# Patient Record
Sex: FEMALE | Race: BLACK OR AFRICAN AMERICAN | Employment: OTHER | URBAN - METROPOLITAN AREA
[De-identification: names, ages, dates, MRNs, and addresses within clinical notes are randomized per-mention and may not be internally consistent; named-entity substitution may affect disease eponyms.]

---

## 2017-07-07 ENCOUNTER — OFFICE VISIT (OUTPATIENT)
Dept: CARDIOLOGY CLINIC | Age: 77
End: 2017-07-07

## 2017-07-07 VITALS
HEIGHT: 64 IN | SYSTOLIC BLOOD PRESSURE: 110 MMHG | DIASTOLIC BLOOD PRESSURE: 60 MMHG | BODY MASS INDEX: 31.48 KG/M2 | HEART RATE: 60 BPM | WEIGHT: 184.4 LBS

## 2017-07-07 DIAGNOSIS — R00.2 PALPITATIONS: ICD-10-CM

## 2017-07-07 DIAGNOSIS — R94.31 ABNORMAL EKG: ICD-10-CM

## 2017-07-07 DIAGNOSIS — E78.00 HYPERCHOLESTEREMIA: ICD-10-CM

## 2017-07-07 DIAGNOSIS — I10 ESSENTIAL HYPERTENSION, BENIGN: Primary | ICD-10-CM

## 2017-07-07 NOTE — MR AVS SNAPSHOT
Visit Information Date & Time Provider Department Dept. Phone Encounter #  
 7/7/2017  3:00 PM Nick Barker MD [facility] [phone] [id number] Follow-up Instructions Return in about 1 year (around 7/7/2018). Upcoming Health Maintenance Date Due DTaP/Tdap/Td series (1 - Tdap) 5/6/1960 ZOSTER VACCINE AGE 60> 5/6/1999 GLAUCOMA SCREENING Q2Y 5/6/2004 OSTEOPOROSIS SCREENING (DEXA) 5/6/2004 Pneumococcal 65+ Low/Medium Risk (1 of 2 - PCV13) 5/6/2004 MEDICARE YEARLY EXAM 5/6/2004 INFLUENZA AGE 9 TO ADULT 8/1/2017 Allergies as of 7/7/2017  Review Complete On: 7/7/2017 By: Rebeka Friend LPN Severity Noted Reaction Type Reactions Aspirin  10/11/2010   Intolerance Nausea Only Codeine  10/11/2010   Intolerance Nausea Only Lovastatin  11/04/2016    Myalgia Current Immunizations  Never Reviewed No immunizations on file. Not reviewed this visit You Were Diagnosed With   
  
 Codes Comments Essential hypertension, benign    -  Primary ICD-10-CM: I10 
ICD-9-CM: 402. 1 Abnormal EKG     ICD-10-CM: R94.31 
ICD-9-CM: 794.31 Hypercholesteremia     ICD-10-CM: E78.00 ICD-9-CM: 272.0 Palpitations     ICD-10-CM: R00.2 ICD-9-CM: 785.1 Vitals BP Pulse Height(growth percentile) Weight(growth percentile) BMI OB Status 110/60 60 5' 4\" (1.626 m) 184 lb 6.4 oz (83.6 kg) 31.65 kg/m2 Postmenopausal  
 Smoking Status Never Smoker Vitals History BMI and BSA Data Body Mass Index Body Surface Area  
 31.65 kg/m 2 1.94 m 2 Preferred Pharmacy Pharmacy Name Phone [facility] [address]  [facility] [phone] Your Updated Medication List  
  
   
This list is accurate as of: 7/7/17  3:08 PM.  Always use your most recent med list. amLODIPine 10 mg tablet Commonly known as:  Nyla Main Take  by mouth daily. bisoprolol-hydroCHLOROthiazide 2.5-6.25 mg per tablet Commonly known as:  [facility] Take 1 Tab by mouth daily. Pt advised not to take this medication the day of surgery per anesthesia protocol. COUMADIN 2 mg tablet Generic drug:  warfarin  
5 mg daily. Pt states she takes 5 mg daily. Next check due 12/16  
  
 doxazosin 1 mg tablet Commonly known as:  CARDURA Take 2 mg by mouth daily. ergocalciferol 50,000 unit capsule Commonly known as:  ERGOCALCIFEROL Take 50,000 Units by mouth every seven (7) days. hydroCHLOROthiazide 12.5 mg capsule Commonly known as:  Christia Members Take 12.5 mg by mouth daily. Pt instructed to omit this medication the morning of surgery per anesthesia protocol. K-DUR 10 mEq tablet Generic drug:  potassium chloride Take 10 mEq by mouth two (2) times a day. Pt advised not to take the day of surgery, per anesthesia guidelines. LORazepam 1 mg tablet Commonly known as:  ATIVAN Take 0.5 mg by mouth. Takes one half to one tablet daily  
  
 losartan 100 mg tablet Commonly known as:  COZAAR Take 100 mg by mouth daily. PREVACID 30 mg capsule Generic drug:  lansoprazole Take  by mouth Daily (before breakfast). Pt instructed to take this medication the morning of surgery, per anesthesia protocol. Follow-up Instructions Return in about 1 year (around 7/7/2018). Introducing [facility]! Barbara Chambers introduces [employer] patient portal. Now you can access parts of your medical record, email your doctor's office, and request medication refills online. 1. In your internet browser, go to [email or web address] 2. Click on the First Time User? Click Here link in the Sign In box. You will see the New Member Sign Up page. 3. Enter your [employer] Access Code exactly as it appears below.  You will not need to use this code after youve completed the sign-up process. If you do not sign up before the expiration date, you must request a new code. · [employer] Access Code: [email or web address] Expires: 10/5/2017  3:08 PM 
 
4. Enter the last four digits of your Social Security Number (xxxx) and Date of Birth (mm/dd/yyyy) as indicated and click Submit. You will be taken to the next sign-up page. 5. Create a [employer] ID. This will be your [employer] login ID and cannot be changed, so think of one that is secure and easy to remember. 6. Create a [employer] password. You can change your password at any time. 7. Enter your Password Reset Question and Answer. This can be used at a later time if you forget your password. 8. Enter your e-mail address. You will receive e-mail notification when new information is available in [address] 9. Click Sign Up. You can now view and download portions of your medical record. 10. Click the Download Summary menu link to download a portable copy of your medical information. If you have questions, please visit the Frequently Asked Questions section of the [employer] website. Remember, [employer] is NOT to be used for urgent needs. For medical emergencies, dial 911. Now available from your iPhone and Android! Please provide this summary of care documentation to your next provider. Your primary care clinician is listed as Maria Isabel Bautista. If you have any questions after today's visit, please call [phone].

## 2017-07-07 NOTE — PROGRESS NOTES
HISTORY OF PRESENT ILLNESS  Kamille Botello is a 65 y.o. female     SUMMARY:   Problem List  Date Reviewed: 7/7/2017          Codes Class Noted    Palpitations ICD-10-CM: R00.2  ICD-9-CM: 785.1  8/17/2016    Overview Signed 8/17/2016  5:18 PM by Sophia Pena MD     5/16 negative event monitor             Chest pain ICD-10-CM: R07.9  ICD-9-CM: 786.50  10/10/2014    Overview Addendum 10/10/2014  7:09 AM by Sophia Pena MD     4/06 echo, normal left ventricular size and function with mild mitral and tricuspid regurgitation and trace aortic insufficiency  10/10 negative lexiscan cardiolyte, lvef 68%             Abnormal EKG ICD-10-CM: R94.31  ICD-9-CM: 794.31  10/10/2014        Parastomal hernia of ileal conduit ([facility] [address] ICD-10-CM: K94.19  ICD-9-CM: 569.69  10/3/2011        Essential hypertension, benign ICD-10-CM: I10  ICD-9-CM: 401.1  10/6/2010        Hypercholesteremia ICD-10-CM: E78.00  ICD-9-CM: 272.0  10/6/2010              Current Outpatient Prescriptions on File Prior to Visit   Medication Sig    ergocalciferol (ERGOCALCIFEROL) 50,000 unit capsule Take 50,000 Units by mouth every seven (7) days.  doxazosin (CARDURA) 1 mg tablet Take 2 mg by mouth daily.  losartan (COZAAR) 100 mg tablet Take 100 mg by mouth daily.  amlodipine (NORVASC) 10 mg tablet Take  by mouth daily.  bisoprolol-hydrochlorothiazide (ZIAC) 2.5-6.25 mg per tablet Take 1 Tab by mouth daily. Pt advised not to take this medication the day of surgery per anesthesia protocol.  warfarin (COUMADIN) 2 mg tablet 5 mg daily. Pt states she takes 5 mg daily. Next check due 12/16    hydrochlorothiazide (MICROZIDE) 12.5 mg capsule Take 12.5 mg by mouth daily. Pt instructed to omit this medication the morning of surgery per anesthesia protocol.  lansoprazole (PREVACID) 30 mg capsule Take  by mouth Daily (before breakfast). Pt instructed to take this medication the morning of surgery, per anesthesia protocol.     potassium chloride (K-DUR) 10 mEq tablet Take 10 mEq by mouth two (2) times a day. Pt advised not to take the day of surgery, per anesthesia guidelines.  lorazepam (ATIVAN) 1 mg tablet Take 0.5 mg by mouth. Takes one half to one tablet daily     No current facility-administered medications on file prior to visit. CARDIOLOGY STUDIES TO DATE:  4/06 echo, normal left ventricular size and function with mild mitral and tricuspid regurgitation and trace aortic insufficiency  10/10 negative lexiscan cardiolyte, lvef 68%  5/15 negative event monitor  6/16 negative event monitor      Chief Complaint   Patient presents with    Hypertension     HPI :  Ms. Dion Salazar recently saw Dr. Stefani Swain and was complaining of some shortness of breath with activity. This is an old complaint she has had with me for years, and all of her testing has never revealed any significant cardiac abnormalities. She is very inactive because of knee and hip pain and gets no regular exercise. Dr. Stefani Swain did an x-ray and an EKG and told Ms. Contreras that everything looked fine.      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 Visit Vitals    /60    Pulse 60    Ht 5' 4\" (1.626 m)    Wt 184 lb 6.4 oz (83.6 kg)    BMI 31.65 kg/m2       Wt Readings from Last 3 Encounters:   07/07/17 184 lb 6.4 oz (83.6 kg)   11/29/16 184 lb (83.5 kg)   11/04/16 187 lb (84.8 kg)            BP Readings from Last 3 Encounters:   07/07/17 110/60   11/29/16 155/71   11/04/16 130/66       PHYSICAL EXAM  General appearance: alert, cooperative, no distress, appears stated age  Neck: supple, symmetrical, trachea midline, no adenopathy, no carotid bruit and no JVD  Lungs: clear to auscultation bilaterally  Heart: regular rate and rhythm, S1, S2 normal, no murmur, click, rub or gallop  Extremities: extremities normal, atraumatic, no cyanosis or edema      ASSESSMENT  As I mentioned this is an old complaint to me from Ms. Contreras and given her recent x-ray and EKG findings I have no reason to suspect any bad cardiac issues at this time. I suggested she consider getting a stationary bike and try some light exercise on that she should be able to do in spite of her orthopedic issues. She needs no specific cardiac testing at this time. current treatment plan is effective, no change in therapy  lab results and schedule of future lab studies reviewed with patient  reviewed diet, exercise and weight control    Encounter Diagnoses   Name Primary?  Essential hypertension, benign Yes    Abnormal EKG     Hypercholesteremia     Palpitations      No orders of the defined types were placed in this encounter. Follow-up Disposition:  Return in about 1 year (around 7/7/2018).     Trevon Green MD  7/7/2017

## 2018-06-07 ENCOUNTER — OFFICE VISIT (OUTPATIENT)
Dept: CARDIOLOGY CLINIC | Age: 78
End: 2018-06-07

## 2018-06-07 VITALS
DIASTOLIC BLOOD PRESSURE: 80 MMHG | WEIGHT: 181 LBS | HEIGHT: 64 IN | BODY MASS INDEX: 30.9 KG/M2 | OXYGEN SATURATION: 99 % | HEART RATE: 70 BPM | RESPIRATION RATE: 20 BRPM | SYSTOLIC BLOOD PRESSURE: 160 MMHG

## 2018-06-07 DIAGNOSIS — I10 ESSENTIAL HYPERTENSION, BENIGN: Primary | ICD-10-CM

## 2018-06-07 DIAGNOSIS — R94.31 ABNORMAL EKG: ICD-10-CM

## 2018-06-07 DIAGNOSIS — R00.2 PALPITATIONS: ICD-10-CM

## 2018-06-07 DIAGNOSIS — E78.00 HYPERCHOLESTEREMIA: ICD-10-CM

## 2018-06-07 RX ORDER — SPIRONOLACTONE 25 MG/1
12.5 TABLET ORAL DAILY
COMMUNITY

## 2018-06-07 RX ORDER — PHENOL/SODIUM PHENOLATE
20 AEROSOL, SPRAY (ML) MUCOUS MEMBRANE DAILY
COMMUNITY

## 2018-06-07 NOTE — PROGRESS NOTES
HISTORY OF PRESENT ILLNESS  Terra Sam is a 77 y.o. female     SUMMARY:   Problem List  Date Reviewed: 6/7/2018          Codes Class Noted    Palpitations ICD-10-CM: R00.2  ICD-9-CM: 785.1  8/17/2016    Overview Signed 8/17/2016  5:18 PM by Jesse Rosales MD     5/16 negative event monitor             Chest pain ICD-10-CM: R07.9  ICD-9-CM: 786.50  10/10/2014    Overview Addendum 10/10/2014  7:09 AM by Jesse Rosales MD     4/06 echo, normal left ventricular size and function with mild mitral and tricuspid regurgitation and trace aortic insufficiency  10/10 negative lexiscan cardiolyte, lvef 68%             Abnormal EKG ICD-10-CM: R94.31  ICD-9-CM: 794.31  10/10/2014        Parastomal hernia of ileal conduit ([facility] [address] ICD-10-CM: K94.19  ICD-9-CM: 569.69  10/3/2011        Essential hypertension, benign ICD-10-CM: I10  ICD-9-CM: 401.1  10/6/2010        Hypercholesteremia ICD-10-CM: E78.00  ICD-9-CM: 272.0  10/6/2010              Current Outpatient Prescriptions on File Prior to Visit   Medication Sig    doxazosin (CARDURA) 1 mg tablet Take 2 mg by mouth daily.  losartan (COZAAR) 100 mg tablet Take 100 mg by mouth daily.  amlodipine (NORVASC) 10 mg tablet Take  by mouth daily.  bisoprolol-hydrochlorothiazide (ZIAC) 2.5-6.25 mg per tablet Take 1 Tab by mouth daily. Pt advised not to take this medication the day of surgery per anesthesia protocol.  warfarin (COUMADIN) 2 mg tablet 5 mg daily. Pt states she takes 5 mg daily. Next check due 12/16    hydrochlorothiazide (MICROZIDE) 12.5 mg capsule Take 12.5 mg by mouth daily. Pt instructed to omit this medication the morning of surgery per anesthesia protocol.  potassium chloride (K-DUR) 10 mEq tablet Take 10 mEq by mouth two (2) times a day. Pt advised not to take the day of surgery, per anesthesia guidelines.  lorazepam (ATIVAN) 1 mg tablet Take 0.5 mg by mouth.  Takes one half to one tablet daily    ergocalciferol (ERGOCALCIFEROL) 50,000 unit capsule Take 50,000 Units by mouth every seven (7) days.  lansoprazole (PREVACID) 30 mg capsule Take  by mouth Daily (before breakfast). Pt instructed to take this medication the morning of surgery, per anesthesia protocol. No current facility-administered medications on file prior to visit. CARDIOLOGY STUDIES TO DATE:  4/06 echo, normal left ventricular size and function with mild mitral and tricuspid regurgitation and trace aortic insufficiency  10/10 negative lexiscan cardiolyte, lvef 68%  5/15 negative event monitor  6/16 negative event monitor        Chief Complaint   Patient presents with    Hypertension     HPI :  Ms. Kelly Badillo is doing well. Blood pressure is a little elevated today, but she just took her morning medications and she gets her Coumadin checked once a month with Dr. Thomas Chang and they check her blood pressure there and it has been just fine. She is active, but not exercising or walking like she should. CARDIAC ROS:   negative for chest pain, dyspnea, palpitations, syncope, orthopnea, paroxysmal nocturnal dyspnea, exertional chest pressure/discomfort, claudication, lower extremity edema    Family History   Problem Relation Age of Onset    Heart Disease Mother     Heart Disease Father        Past Medical History:   Diagnosis Date    Arthritis     BOTH KNEES AND BOTH HANDS RIGHT HIP    Cancer [facility]) 2006    BLADDER    Cancer ([facility] [address] 1982    BREAST RIGHT CA    Chronic pain     ARTHRITIS    Essential hypertension     GERD (gastroesophageal reflux disease)     [place] WITH MEDICATION    Hypercholesteremia 10/6/2010    Hypertension     TREATED WITH MEDICATION    Other ill-defined conditions(799.89)     IVCD Filter    Psychiatric disorder     ANXIETY    Thromboembolus ([address]     BLOOD CLOTS IN BOTH LEGS FOLLOWING BLADDER Merle Specking TAKES COUMADIN       GENERAL ROS:  A comprehensive review of systems was negative except for that written in the HPI.     Visit Vitals    BP 160/80 (BP 1 Location: Left arm, BP Patient Position: Sitting)    Pulse 70    Resp 20    Ht 5' 4\" (1.626 m)    Wt 181 lb (82.1 kg)    SpO2 99%    BMI 31.07 kg/m2       Wt Readings from Last 3 Encounters:   06/07/18 181 lb (82.1 kg)   07/07/17 184 lb 6.4 oz (83.6 kg)   11/29/16 184 lb (83.5 kg)            BP Readings from Last 3 Encounters:   06/07/18 160/80   07/07/17 110/60   11/29/16 155/71       PHYSICAL EXAM  General appearance: alert, cooperative, no distress, appears stated age  Neck: supple, symmetrical, trachea midline, no adenopathy, no carotid bruit and no JVD  Lungs: clear to auscultation bilaterally  Heart: regular rate and rhythm, S1, S2 normal, no murmur, click, rub or gallop  Extremities: extremities normal, atraumatic, no cyanosis or edema      ASSESSMENT  Ms. Shey Alfaro is stable, asymptomatic and well compensated on a good medical regimen and needs no cardiac testing at this time. current treatment plan is effective, no change in therapy  lab results and schedule of future lab studies reviewed with patient  reviewed diet, exercise and weight control    Encounter Diagnoses   Name Primary?  Essential hypertension, benign Yes    Abnormal EKG     Hypercholesteremia     Palpitations      Orders Placed This Encounter    spironolactone (ALDACTONE) 25 mg tablet    Omeprazole delayed release (PRILOSEC D/R) 20 mg tablet       Follow-up Disposition:  Return in about 1 year (around 6/7/2019).     Pa Sauceda MD  6/7/2018

## 2018-06-07 NOTE — MR AVS SNAPSHOT
[address] [facility] [address] [place] 
[phone] Patient: Sharla Colin MRN: [id number] KHV:9/1/2038 Visit Information Date & Time Provider Department Dept. Phone Encounter #  
 6/7/2018  9:20 AM Nick Barker MD [facility] [phone] [id number] Follow-up Instructions Return in about 1 year (around 6/7/2019). Your Appointments 6/7/2018  9:20 AM  
ESTABLISHED PATIENT with Nick Barker MD  
[facility] ([facility]) Appt Note: 1 yr f/u; patient r/s to earlier date, pt denies sxs; pt r/s 5-31-18 appt. ..atb  
 [address] [facility] [address] [place]  
[address] [facility] [phone] Upcoming Health Maintenance Date Due DTaP/Tdap/Td series (1 - Tdap) 5/6/1960 ZOSTER VACCINE AGE 60> 3/6/1999 GLAUCOMA SCREENING Q2Y 5/6/2004 Bone Densitometry (Dexa) Screening 5/6/2004 Pneumococcal 65+ Low/Medium Risk (1 of 2 - PCV13) 5/6/2004 MEDICARE YEARLY EXAM 3/14/2018 Influenza Age 5 to Adult 8/1/2018 Allergies as of 6/7/2018  Review Complete On: 6/7/2018 By: Nick Barker MD  
  
 Severity Noted Reaction Type Reactions Aspirin  10/11/2010   Intolerance Nausea Only Codeine  10/11/2010   Intolerance Nausea Only Lovastatin  11/04/2016    Myalgia Current Immunizations  Never Reviewed No immunizations on file. Not reviewed this visit Vitals BP Pulse Resp Height(growth percentile) Weight(growth percentile) SpO2  
 160/80 (BP 1 Location: Left arm, BP Patient Position: Sitting) 70 20 5' 4\" (1.626 m) 181 lb (82.1 kg) 99% BMI OB Status Smoking Status 31.07 kg/m2 Postmenopausal Never Smoker Vitals History BMI and BSA Data Body Mass Index Body Surface Area 31.07 kg/m 2 1.93 m 2 Preferred Pharmacy Pharmacy Name Phone [address] [place] Your Updated Medication List  
  
   
This list is accurate as of 6/7/18  9:02 AM.  Always use your most recent med list. ALDACTONE 25 mg tablet Generic drug:  spironolactone Take 12.5 mg by mouth daily. amLODIPine 10 mg tablet Commonly known as:  Judah Fabian Take  by mouth daily. bisoprolol-hydroCHLOROthiazide 2.5-6.25 mg per tablet Commonly known as:  [facility] Take 1 Tab by mouth daily. Pt advised not to take this medication the day of surgery per anesthesia protocol. COUMADIN 2 mg tablet Generic drug:  warfarin  
5 mg daily. Pt states she takes 5 mg daily. Next check due 12/16  
  
 doxazosin 1 mg tablet Commonly known as:  CARDURA Take 2 mg by mouth daily. ergocalciferol 50,000 unit capsule Commonly known as:  ERGOCALCIFEROL Take 50,000 Units by mouth every seven (7) days. hydroCHLOROthiazide 12.5 mg capsule Commonly known as:  Sharad Dose Take 12.5 mg by mouth daily. Pt instructed to omit this medication the morning of surgery per anesthesia protocol. K-DUR 10 mEq tablet Generic drug:  potassium chloride Take 10 mEq by mouth two (2) times a day. Pt advised not to take the day of surgery, per anesthesia guidelines. LORazepam 1 mg tablet Commonly known as:  ATIVAN Take 0.5 mg by mouth. Takes one half to one tablet daily  
  
 losartan 100 mg tablet Commonly known as:  COZAAR Take 100 mg by mouth daily. Omeprazole delayed release 20 mg tablet Commonly known as:  PRILOSEC D/R Take 20 mg by mouth daily. PREVACID 30 mg capsule Generic drug:  lansoprazole Take  by mouth Daily (before breakfast). Pt instructed to take this medication the morning of surgery, per anesthesia protocol. Follow-up Instructions Return in about 1 year (around 6/7/2019). \[facility] [place] [facility]\"" & [facility]! Gail Thurman introduces [employer] patient portal. Now you can access parts of your medical record, email your doctor's office, and request medication refills online. 1. In your internet browser, go to [email or web address] 2. Click on the First Time User? Click Here link in the Sign In box. You will see the New Member Sign Up page. 3. Enter your [employer] Access Code exactly as it appears below. You will not need to use this code after youve completed the sign-up process. If you do not sign up before the expiration date, you must request a new code. · [employer] Access Code: --[email or web address] Expires: 9/5/2018  9:02 AM 
 
4. Enter the last four digits of your Social Security Number (xxxx) and Date of Birth (mm/dd/yyyy) as indicated and click Submit. You will be taken to the next sign-up page. 5. Create a [employer] ID. This will be your [employer] login ID and cannot be changed, so think of one that is secure and easy to remember. 6. Create a [employer] password. You can change your password at any time. 7. Enter your Password Reset Question and Answer. This can be used at a later time if you forget your password. 8. Enter your e-mail address. You will receive e-mail notification when new information is available in [address] 9. Click Sign Up. You can now view and download portions of your medical record. 10. Click the Download Summary menu link to download a portable copy of your medical information. If you have questions, please visit the Frequently Asked Questions section of the [employer] website. Remember, [employer] is NOT to be used for urgent needs. For medical emergencies, dial 911. Now available from your iPhone and Android! Please provide this summary of care documentation to your next provider. Your primary care clinician is listed as Justine Amaro. If you have any questions after today's visit, please call [phone].

## 2019-06-13 ENCOUNTER — OFFICE VISIT (OUTPATIENT)
Dept: CARDIOLOGY CLINIC | Age: 79
End: 2019-06-13

## 2019-06-13 VITALS
BODY MASS INDEX: 29.6 KG/M2 | OXYGEN SATURATION: 98 % | SYSTOLIC BLOOD PRESSURE: 126 MMHG | HEIGHT: 64 IN | DIASTOLIC BLOOD PRESSURE: 80 MMHG | HEART RATE: 61 BPM | WEIGHT: 173.4 LBS | RESPIRATION RATE: 16 BRPM

## 2019-06-13 DIAGNOSIS — E78.00 HYPERCHOLESTEREMIA: ICD-10-CM

## 2019-06-13 DIAGNOSIS — Z78.9 STATIN INTOLERANCE: ICD-10-CM

## 2019-06-13 DIAGNOSIS — R00.2 PALPITATIONS: Primary | ICD-10-CM

## 2019-06-13 DIAGNOSIS — R94.31 ABNORMAL EKG: ICD-10-CM

## 2019-06-13 DIAGNOSIS — I10 ESSENTIAL HYPERTENSION, BENIGN: ICD-10-CM

## 2019-06-13 NOTE — PROGRESS NOTES
HISTORY OF PRESENT ILLNESS  Juna Barillas is a [de-identified] y.o. female     SUMMARY:   Problem List  Date Reviewed: 6/12/2019          Codes Class Noted    Palpitations ICD-10-CM: R00.2  ICD-9-CM: 785.1  8/17/2016    Overview Signed 8/17/2016  5:18 PM by Palmira Camejo MD     5/16 negative event monitor             Chest pain ICD-10-CM: R07.9  ICD-9-CM: 786.50  10/10/2014    Overview Addendum 10/10/2014  7:09 AM by Palmira Camejo MD     4/06 echo, normal left ventricular size and function with mild mitral and tricuspid regurgitation and trace aortic insufficiency  10/10 negative lexiscan cardiolyte, lvef 68%             Abnormal EKG ICD-10-CM: R94.31  ICD-9-CM: 794.31  10/10/2014        Parastomal hernia of ileal conduit ([facility] [address] ICD-10-CM: K94.19  ICD-9-CM: 569.69  10/3/2011        Essential hypertension, benign ICD-10-CM: I10  ICD-9-CM: 401.1  10/6/2010        Hypercholesteremia ICD-10-CM: E78.00  ICD-9-CM: 272.0  10/6/2010              Current Outpatient Medications on File Prior to Visit   Medication Sig    spironolactone (ALDACTONE) 25 mg tablet Take 12.5 mg by mouth daily.  Omeprazole delayed release (PRILOSEC D/R) 20 mg tablet Take 20 mg by mouth daily.  doxazosin (CARDURA) 1 mg tablet Take 2 mg by mouth daily.  losartan (COZAAR) 100 mg tablet Take 100 mg by mouth daily.  amlodipine (NORVASC) 10 mg tablet Take  by mouth daily.  bisoprolol-hydrochlorothiazide (ZIAC) 2.5-6.25 mg per tablet Take 1 Tab by mouth daily. Pt advised not to take this medication the day of surgery per anesthesia protocol.  warfarin (COUMADIN) 2 mg tablet 5 mg daily. Pt states she takes 5 mg daily. Next check due 12/16    hydrochlorothiazide (MICROZIDE) 12.5 mg capsule Take 12.5 mg by mouth daily. Pt instructed to omit this medication the morning of surgery per anesthesia protocol.  lansoprazole (PREVACID) 30 mg capsule Take  by mouth Daily (before breakfast).  Pt instructed to take this medication the morning of surgery, per anesthesia protocol.  potassium chloride (K-DUR) 10 mEq tablet Take 10 mEq by mouth two (2) times a day. Pt advised not to take the day of surgery, per anesthesia guidelines.  lorazepam (ATIVAN) 1 mg tablet Take 0.5 mg by mouth. Takes one half to one tablet daily     No current facility-administered medications on file prior to visit. CARDIOLOGY STUDIES TO DATE:  4/06 echo, normal left ventricular size and function with mild mitral and tricuspid regurgitation and trace aortic insufficiency  10/10 negative lexiscan cardiolyte, lvef 68%  5/15 negative event monitor  6/16 negative event monitor    Chief Complaint   Patient presents with    Irregular Heart Beat     HPI :  Ms. Chantel Cm is doing well. She has very rare palpitations, nothing sustained or worrisome to her. She is active, but not exercising. Blood pressure is under excellent control. 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     Visit Vitals  /80 (BP 1 Location: Left arm, BP Patient Position: Sitting)   Pulse 61   Resp 16   Ht 5' 4\" (1.626 m)   Wt 173 lb 6.4 oz (78.7 kg)   SpO2 98%   BMI 29.76 kg/m²       Wt Readings from Last 3 Encounters:   06/13/19 173 lb 6.4 oz (78.7 kg)   06/07/18 181 lb (82.1 kg)   07/07/17 184 lb 6.4 oz (83.6 kg)            BP Readings from Last 3 Encounters:   06/13/19 126/80   06/07/18 160/80   07/07/17 110/60       PHYSICAL EXAM  General appearance: alert, cooperative, no distress, appears stated age  Neurologic: Alert and oriented X 3  Neck: supple, symmetrical, trachea midline, no adenopathy, no carotid bruit and no JVD  Lungs: clear to auscultation bilaterally  Heart: regular rate and rhythm, S1, S2 normal, no murmur, click, rub or gallop  Extremities: extremities normal, atraumatic, no cyanosis or edema      ASSESSMENT  Ms. Augusto Sanders is stable and minimally symptomatic with occasional PVCs, but no worrisome symptoms. She and her  are concerned about the distance to get to our office here at [facility], so she would like to follow up at [address] [place] [address] and we will make her an appointment to see Dr. Laure Franco over there in about a year. current treatment plan is effective, no change in therapy  lab results and schedule of future lab studies reviewed with patient  reviewed diet, exercise and weight control    Encounter Diagnoses   Name Primary?  Palpitations Yes    Abnormal EKG     Essential hypertension, benign     Hypercholesteremia      No orders of the defined types were placed in this encounter. Follow-up and Dispositions    · Return in about 1 year (around 6/13/2020).          Michael Garcia MD  6/13/2019

## 2022-09-16 ENCOUNTER — OFFICE VISIT (OUTPATIENT)
Dept: NEUROLOGY | Age: 82
End: 2022-09-16
Payer: MEDICARE

## 2022-09-16 VITALS
BODY MASS INDEX: 29.02 KG/M2 | OXYGEN SATURATION: 97 % | RESPIRATION RATE: 15 BRPM | HEIGHT: 64 IN | SYSTOLIC BLOOD PRESSURE: 140 MMHG | DIASTOLIC BLOOD PRESSURE: 58 MMHG | HEART RATE: 57 BPM | WEIGHT: 170 LBS

## 2022-09-16 DIAGNOSIS — R55 PRE-SYNCOPE: ICD-10-CM

## 2022-09-16 DIAGNOSIS — M79.604 PAIN IN BOTH LOWER EXTREMITIES: ICD-10-CM

## 2022-09-16 DIAGNOSIS — R29.2 ABNORMAL DTR (DEEP TENDON REFLEX): ICD-10-CM

## 2022-09-16 DIAGNOSIS — M79.605 PAIN IN BOTH LOWER EXTREMITIES: ICD-10-CM

## 2022-09-16 DIAGNOSIS — R74.8 ELEVATED CK: ICD-10-CM

## 2022-09-16 DIAGNOSIS — R26.9 GAIT DISTURBANCE: ICD-10-CM

## 2022-09-16 DIAGNOSIS — R29.898 WEAKNESS OF BOTH LEGS: Primary | ICD-10-CM

## 2022-09-16 DIAGNOSIS — R29.2 HOFFMAN SIGN PRESENT: ICD-10-CM

## 2022-09-16 PROCEDURE — G8432 DEP SCR NOT DOC, RNG: HCPCS | Performed by: PSYCHIATRY & NEUROLOGY

## 2022-09-16 PROCEDURE — G8753 SYS BP > OR = 140: HCPCS | Performed by: PSYCHIATRY & NEUROLOGY

## 2022-09-16 PROCEDURE — G8754 DIAS BP LESS 90: HCPCS | Performed by: PSYCHIATRY & NEUROLOGY

## 2022-09-16 PROCEDURE — 1090F PRES/ABSN URINE INCON ASSESS: CPT | Performed by: PSYCHIATRY & NEUROLOGY

## 2022-09-16 PROCEDURE — G8400 PT W/DXA NO RESULTS DOC: HCPCS | Performed by: PSYCHIATRY & NEUROLOGY

## 2022-09-16 PROCEDURE — G8536 NO DOC ELDER MAL SCRN: HCPCS | Performed by: PSYCHIATRY & NEUROLOGY

## 2022-09-16 PROCEDURE — 1101F PT FALLS ASSESS-DOCD LE1/YR: CPT | Performed by: PSYCHIATRY & NEUROLOGY

## 2022-09-16 PROCEDURE — 99204 OFFICE O/P NEW MOD 45 MIN: CPT | Performed by: PSYCHIATRY & NEUROLOGY

## 2022-09-16 PROCEDURE — G8427 DOCREV CUR MEDS BY ELIG CLIN: HCPCS | Performed by: PSYCHIATRY & NEUROLOGY

## 2022-09-16 PROCEDURE — G8417 CALC BMI ABV UP PARAM F/U: HCPCS | Performed by: PSYCHIATRY & NEUROLOGY

## 2022-09-16 PROCEDURE — 1123F ACP DISCUSS/DSCN MKR DOCD: CPT | Performed by: PSYCHIATRY & NEUROLOGY

## 2022-09-16 RX ORDER — SERTRALINE HYDROCHLORIDE 25 MG/1
TABLET, FILM COATED ORAL DAILY
COMMUNITY

## 2022-09-16 NOTE — PROGRESS NOTES
[facility] and [address] at [facility] at [address], [address] [place] [address] [id number] [facility], [address] [place] [address]  [phone] Office  [phone]           Referring: Michael Moscoso MD  [facility],  [facility]     Chief Complaint   Patient presents with    New Patient    Extremity Weakness     Weakness in legs starting this Spring, stumbles while walking     21-year-old lady presents today company by her daughter for initial neurologic consultation regarding weakness in her legs. She started to notice difficulty in the spring. No inciting factor such as illness, injury or change in medication. The only medication change she had was a little Ativan to help with her anxiety. She notes she is quite an anxious person. She says that she has difficulty with her gait and that she does not feel steady. Her legs feel weak. Is difficult for her to step up onto a curb and difficulty for her to step up on the sidewalk. Stairs give her trouble. She has to push herself out of a chair and at times has to push her self off of the toilet as well. She says getting out of a chair it takes her several attempts before she is able to get out. She also notes she has pain in the legs and gets cramps. She did see Dr. Uzma Solitario who did a multitude of laboratory analysis. She did have a short course of prednisone and she thinks that may have helped a little. Earlier this week she awakened with dizziness described as spinning of the room but then she also says that she felt lightheaded and things started to go black and she felt like she might pass out. She did not do so.   She went to the [facility] after calling Dr. Kamlesh Brown office and she was evaluated with a head CT that was said to be fine with no sign of hemorrhage certainly which is important given her chronic anticoagulation with Coumadin. Those symptoms passed after a couple of hours. She had no focal deficits with them. No loss or change in vision. No numbness or tingling. Records from Dr. Walter Wheat are kindly provided and are reviewed and these include  Laboratory analysis June 9, 2022  Ferritin 133  TSH normal  T4 normal  T3 uptake unremarkable  Free thyroxine normal  CBC with hemoglobin 10.9 otherwise unremarkable  Magnesium 1.9  LFTs normal  Creatinine 1.33 and remainder of metabolic panel unremarkable  Lipid panel with an LDL of 133  Sedimentation rate 52  CK2 98  Iron studies unremarkable  Progress note dated July 5, 2022 where patient was following up for DVT and PE. She said to be on lifelong anticoagulation. Had some balance issues and question RLS. She had some sway with Romberg and some slight weakness in her lower extremities.   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    Chronic pain     ARTHRITIS    Essential hypertension     GERD (gastroesophageal reflux disease)     [facility]    Hypercholesteremia 10/6/2010    Hypertension     TREATED WITH MEDICATION    Other ill-defined conditions(799.89)     IVCD Filter    Psychiatric disorder     ANXIETY    Thromboembolus ([address]     BLOOD CLOTS IN BOTH LEGS FOLLOWING BLADDER Bettye Sandoval TAKES COUMADIN       Past Surgical History:   Procedure Laterality Date    COLONOSCOPY N/A 11/29/2016    COLONOSCOPY performed by Gloria Davis MD at [facility]    HX CYSTECTOMY      HX HEENT      CATERACTS BOTH EYES    HX HERNIA REPAIR      HX MASTECTOMY      RIGHT BREAST    HX UROLOGICAL      BLADDER CANCER, ILEOSTOMY       Current Outpatient Medications   Medication Sig Dispense Refill spironolactone (ALDACTONE) 25 mg tablet Take 12.5 mg by mouth daily. Omeprazole delayed release (PRILOSEC D/R) 20 mg tablet Take 20 mg by mouth daily. doxazosin (CARDURA) 1 mg tablet Take 2 mg by mouth daily. losartan (COZAAR) 100 mg tablet Take 100 mg by mouth daily. amlodipine (NORVASC) 10 mg tablet Take  by mouth daily. bisoprolol-hydrochlorothiazide (ZIAC) 2.5-6.25 mg per tablet Take 1 Tab by mouth daily. Pt advised not to take this medication the day of surgery per anesthesia protocol. warfarin (COUMADIN) 2 mg tablet 5 mg daily. Pt states she takes 5 mg daily. Next check due 12/16      hydrochlorothiazide (MICROZIDE) 12.5 mg capsule Take 12.5 mg by mouth daily. Pt instructed to omit this medication the morning of surgery per anesthesia protocol. lansoprazole (PREVACID) 30 mg capsule Take  by mouth Daily (before breakfast). Pt instructed to take this medication the morning of surgery, per anesthesia protocol. potassium chloride (K-DUR) 10 mEq tablet Take 10 mEq by mouth two (2) times a day. Pt advised not to take the day of surgery, per anesthesia guidelines. lorazepam (ATIVAN) 1 mg tablet Take 0.5 mg by mouth. Takes one half to one tablet daily          Allergies   Allergen Reactions    Aspirin Nausea Only    Codeine Nausea Only    Lovastatin Myalgia       Social History     Tobacco Use    Smoking status: Never    Smokeless tobacco: Never   Substance Use Topics    Alcohol use: No    Drug use: No       Family History   Problem Relation Age of Onset    Heart Disease Mother     Heart Disease Father        Review of Systems  Pertinent positives and negatives as noted. Examination  There were no vitals taken for this visit. Visit Vitals  BP (!) 140/58 (BP 1 Location: Left upper arm, BP Patient Position: Sitting, BP Cuff Size: Adult)   Pulse (!) 57   Resp 15   Ht 5' 4\" (1.626 m)   Wt 77.1 kg (170 lb)   SpO2 97%   BMI 29.18 kg/m²   She is awake alert and oriented. Normal speech and normal language. She discusses her medical history. She follows all commands. She has intact cranial nerves II-XII. No nystagmus. She has no pronation and no drift. She is able to resist fully in all muscle groups to direct confrontational testing. She has some limited range of motion about the right shoulder. Her reflexes are abnormally brisk throughout both the upper and lower extremities bilaterally. She has bilateral Jose present. No ataxia. She needs assistance to get off of the examination table. She has a short based unsteady gait and reaches out several times due to unsteadiness. Impression/Plan  21-year-old lady with complaints of bilateral lower extremity leg weakness and pain with elevated CK of mild degree with markedly brisk reflexes and Jose sign present and with an episode of dizziness described both as vertigo and then presyncope  Her examination would suggest a myelopathic process and we will get an MRI of the cervical spine  The pain in her legs and they perceived weakness as well as slightly elevated CK would point more toward a myelopathic process and will get an EMG  For her dizziness/presyncope we will round out her work-up with a carotid Doppler    Follow-up after testing    Stuart Raines MD          This note was created using voice recognition software. Despite editing, there may be syntax errors.

## 2022-09-16 NOTE — PATIENT INSTRUCTIONS
RESULT POLICY      If we have ordered testing for you, know that; \"NO NEWS IS GOOD NEWS! \"     It is our policy that we no longer call patients with results, nor do we  give test results over the phone. We schedule follow up appointments so that your results can be discussed in person. This allows you to address any questions you have regarding the results. If you choose to go to an imaging center outside of [facility], it is your responsibility to bring imaging report and disc to follow up appointment. If something of concern is revealed on your test, we will contact you to discuss the matter and if needed schedule a sooner follow up appointment. Additionally, results may be found by using the My Chart feature and one of our patient service representatives at the  can give you instructions on how to access this feature to utilize our electronic medical record system. Thank you for your understanding. [facility]   Statement to Patients  April 1, 2014      In an effort to ensure the large volume of patient prescription refills is processed in the most efficient and expeditious manner, we are asking our patients to assist us by calling your Pharmacy for all prescription refills, this will include also your  Mail Order Pharmacy. The pharmacy will contact our office electronically to continue the refill process. Please do not wait until the last minute to call your pharmacy. We need at least 48 hours (2days) to fill prescriptions. We also encourage you to call your pharmacy before going to  your prescription to make sure it is ready. With regard to controlled substance prescription refill requests (narcotic refills) that need to be picked up at our office, we ask your cooperation by providing us with at least 72 hours (3days) notice that you will need a refill.     We will not refill narcotic prescription refill requests after 4:00pm on any weekday, Monday through Thursday, or after 2:00pm on Fridays, or on the weekends. We encourage everyone to explore another way of getting your prescription refill request processed using [email or web address], our patient web portal through our electronic medical record system. [email or web address] is an efficient and effective way to communicate your medication request directly to the office and  downloadable as an azael on your smart phone . [email or web address] also features a review functionality that allows you to view your medication list as well as leave messages for your physician. Are you ready to get connected? If so please review the attatched instructions or speak to any of our staff to get you set up right away! Thank you so much for your cooperation. Should you have any questions please contact our Practice Administrator.

## 2022-10-04 ENCOUNTER — HOSPITAL ENCOUNTER (OUTPATIENT)
Dept: MRI IMAGING | Age: 82
Discharge: HOME | End: 2022-10-04
Attending: PSYCHIATRY & NEUROLOGY
Payer: MEDICARE

## 2022-10-04 DIAGNOSIS — R26.9 GAIT DISTURBANCE: ICD-10-CM

## 2022-10-04 DIAGNOSIS — R29.2 HOFFMAN SIGN PRESENT: ICD-10-CM

## 2022-10-04 DIAGNOSIS — R29.2 ABNORMAL DTR (DEEP TENDON REFLEX): ICD-10-CM

## 2022-10-04 DIAGNOSIS — R29.898 WEAKNESS OF BOTH LEGS: ICD-10-CM

## 2022-10-04 DIAGNOSIS — M79.604 PAIN IN BOTH LOWER EXTREMITIES: ICD-10-CM

## 2022-10-04 DIAGNOSIS — R74.8 ELEVATED CK: ICD-10-CM

## 2022-10-04 DIAGNOSIS — M79.605 PAIN IN BOTH LOWER EXTREMITIES: ICD-10-CM

## 2022-10-04 PROCEDURE — 72141 MRI NECK SPINE W/O DYE: CPT

## 2022-10-26 ENCOUNTER — OFFICE VISIT (OUTPATIENT)
Dept: NEUROLOGY | Age: 82
End: 2022-10-26

## 2022-10-26 DIAGNOSIS — G60.8 PERIPHERAL SENSORY NEUROPATHY: Primary | ICD-10-CM

## 2022-10-26 PROCEDURE — 95886 MUSC TEST DONE W/N TEST COMP: CPT | Performed by: PSYCHIATRY & NEUROLOGY

## 2022-10-26 PROCEDURE — 95911 NRV CNDJ TEST 9-10 STUDIES: CPT | Performed by: PSYCHIATRY & NEUROLOGY

## 2022-10-26 NOTE — PROCEDURES
EMG/ NCS Report  DR[facility]  - [facility]  [address] [facility], [address] [place] [address] [address]   Ph: [phone]   FAX: [phone]/ [phone]    Test Date:  10/26/2022    Patient: Eulogio Scott : 1940 Physician: Charles Cordero MD   ID#: [id number] SEX: Female Ref. Phys: Marita Almazan M.D. Patient History / Exam:  Patient complaining of leg weakness and problems walking. Assess for neuropathy vs myopathy vs radiculopathy. EMG & NCV Findings:  Unable to obtain bilateral superficial peroneal and sural sensory responses. Motor nerve conduction studies (as indicated in the tables) were within reference of normal.      All F Wave latencies were within normal limits. All F Wave left vs. right side latency differences were within normal limits. All H Reflex left vs. right side latency differences were within normal limits. Limited disposable concentric needle EMG as patient is on warfarin (as indicated in the table) showed no evidence of electrical instability. Impressions: This study is abnormal. There is electrodiagnostic evidence of.a predominantly distal lower extremity sensory polyneuropathy. There is no evidence of a myopathy or significant lumbar radiculopathy at this time. Thank you for the consult.     Charles Cordero MD    Nerve Conduction Studies  Anti Sensory Summary Table     Stim Site NR Peak (ms) Norm Peak (ms) P-T Amp (µV) Norm P-T Amp Site1 Site2 Dist (cm)   Left Sup Fibular Anti Sensory (Lat ankle)  30.9 °C   Lower leg NR  <4.6  >4 Lower leg Lat ankle 10.0   Site 2 NR          Site 3 NR          Right Sup Fibular Anti Sensory (Lat ankle)  31.3 °C   Lower leg NR  <4.6  >4 Lower leg Lat ankle 10.0   Left Sural Anti Sensory (Lat Mall)  30.8 °C   Calf NR  <4.5  >4.0 Calf Lat Mall 14.0   Right Sural Anti Sensory (Lat Mall)  30.8 °C   Calf NR  <4.5  >4.0 Calf Lat Mall 14.0   Site 2 NR          Site 3    8.7  20.8         Motor Summary Table     Stim Site NR Onset (ms) Norm Onset (ms) O-P Amp (mV) Norm O-P Amp Amp (Prev) (%) Site1 Site2 Dist (cm) Melo (m/s) Norm Melo (m/s)   Left Fibular Motor (Ext Dig Brev)  30.9 °C   Ankle    4.4 <6.5 4.9 >1.1 100.0 Ankle Ext Dig Brev 8.0     B Fib    11.6  3.8  77.6 B Fib Ankle 30.0 42 >38   Poplt    13.7  3.6  94.7 Poplt B Fib 10.0 48 >42   Right Fibular Motor (Ext Dig Brev)  30.5 °C   Ankle    4.1 <6.5 5.7 >1.1 100.0 Ankle Ext Dig Brev 8.0     B Fib    10.9  6.2  108.8 B Fib Ankle 28.0 41 >38   Poplt    12.5  6.9  111.3 Poplt B Fib 10.0 63 >42   Left Tibial Motor (Abd Garcia Brev)  31.4 °C   Ankle    4.8 <6.1 3.8 >1.1 100.0 Ankle Abd Garcia Brev 8.0     Knee    12.5  2.5  65.8 Knee Ankle 32.0 42 >39   Right Tibial Motor (Abd Garcia Brev)  30.5 °C   Ankle    5.3 <6.1 3.4 >1.1 100.0 Ankle Abd Garcia Brev 8.0     Knee    13.1  3.2  94.1 Knee Ankle 32.0 41 >39     F Wave Studies     NR F-Lat (ms) Lat Norm (ms) L-R F-Lat (ms) L-R Lat Norm   Left Tibial (Mrkrs) (Abd Hallucis)  31.8 °C      51.32 <56 0.74 <5.7   Right Tibial (Mrkrs) (Abd Hallucis)  30.5 °C      50.59 <56 0.74 <5.7     H Reflex Studies     NR H-Lat (ms) L-R H-Lat (ms) L-R Lat Norm   Left Tibial (Gastroc)  31.3 °C      32.79 0.42 <2.0   Right Tibial (Gastroc)  30.4 °C      32.37 0.42 <2.0       EMG     Side Muscle Nerve Root Ins Act Fibs Psw Recrt Duration Amp Poly Comment   Left VastusLat Femoral L2-4 Nml Nml Nml Nml Nml Nml Nml    Right VastusLat Femoral L2-4 Nml Nml Nml Nml Nml Nml Nml    Left MedGastroc Tibial S1-2 Nml Nml Nml Nml Nml Nml Nml    Left AntTibialis Dp Br Peron L4-5 Nml Nml Nml Nml Nml Nml Nml    Left VastusMed Femoral L2-4 Nml Nml Nml Nml Nml Nml Nml    Left Peroneus Long   Nml Nml Nml Nml Nml Nml Nml    Right VastusMed Femoral L2-4 Nml Nml Nml Nml Nml Nml Nml    Right MedGastroc Tibial S1-2 Nml Nml Nml Nml Nml Nml Nml    Right Peroneus Long   Nml Nml Nml Nml Nml Nml Nml      Waveforms:

## 2023-02-06 ENCOUNTER — TRANSCRIBE ORDER (OUTPATIENT)
Dept: SCHEDULING | Age: 83
End: 2023-02-06

## 2023-02-06 DIAGNOSIS — R13.10 PROBLEMS WITH SWALLOWING AND MASTICATION: Primary | ICD-10-CM

## 2023-02-08 ENCOUNTER — HOSPITAL ENCOUNTER (OUTPATIENT)
Dept: GENERAL RADIOLOGY | Age: 83
Discharge: HOME | End: 2023-02-08
Attending: FAMILY MEDICINE
Payer: MEDICARE

## 2023-02-08 DIAGNOSIS — R13.10 PROBLEMS WITH SWALLOWING AND MASTICATION: ICD-10-CM

## 2023-02-08 PROCEDURE — 92611 MOTION FLUOROSCOPY/SWALLOW: CPT

## 2023-02-08 PROCEDURE — 74230 X-RAY XM SWLNG FUNCJ C+: CPT
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
## 2023-02-16 ENCOUNTER — OFFICE VISIT (OUTPATIENT)
Dept: NEUROLOGY | Age: 83
End: 2023-02-16
Payer: MEDICARE

## 2023-02-16 VITALS
OXYGEN SATURATION: 96 % | SYSTOLIC BLOOD PRESSURE: 172 MMHG | HEART RATE: 70 BPM | RESPIRATION RATE: 16 BRPM | DIASTOLIC BLOOD PRESSURE: 73 MMHG

## 2023-02-16 DIAGNOSIS — M48.02 CERVICAL STENOSIS OF SPINAL CANAL: Primary | ICD-10-CM

## 2023-02-16 RX ORDER — ERGOCALCIFEROL 1.25 MG/1
CAPSULE ORAL
COMMUNITY
Start: 2023-01-04

## 2023-02-16 RX ORDER — PRAMIPEXOLE DIHYDROCHLORIDE 0.12 MG/1
TABLET ORAL
COMMUNITY
Start: 2022-11-21

## 2023-02-16 RX ORDER — LOVASTATIN 10 MG/1
TABLET ORAL
COMMUNITY
Start: 2023-01-31

## 2023-02-16 NOTE — PROGRESS NOTES
[facility]rology Clinics and [address] at [facility] Neurology Clinics at [address] [facility], [address] [address] [address] [address] [facility], [address] [place] [address]   [phone]              Chief Complaint   Patient presents with    Results     Current Outpatient Medications   Medication Sig Dispense Refill    lovastatin (MEVACOR) 10 mg tablet TAKE 1 TABLET BY MOUTH EVERY DAY WITH EVENING MEAL      pramipexole (MIRAPEX) 0.125 mg tablet TAKE 1 TABLET BY MOUTH 2 HOURS BEFORE BED FOR RESTLESS LEGS & LEG CRAMPS      ergocalciferol (ERGOCALCIFEROL) 1,250 mcg (50,000 unit) capsule TAKE 1 CAPSULE BY MOUTH WEEKLY      sertraline (ZOLOFT) 25 mg tablet Take  by mouth daily. spironolactone (ALDACTONE) 25 mg tablet Take 12.5 mg by mouth daily. Omeprazole delayed release (PRILOSEC D/R) 20 mg tablet Take 20 mg by mouth daily. doxazosin (CARDURA) 1 mg tablet Take 1 mg by mouth daily. amlodipine (NORVASC) 10 mg tablet Take  by mouth daily. bisoprolol-hydrochlorothiazide (ZIAC) 2.5-6.25 mg per tablet Take 1 Tab by mouth daily. Pt advised not to take this medication the day of surgery per anesthesia protocol. warfarin (COUMADIN) 2 mg tablet daily. Take 4 mg every day EXCEPT tue and Fri take 5 mg      hydrochlorothiazide (MICROZIDE) 12.5 mg capsule Take 12.5 mg by mouth daily. Pt instructed to omit this medication the morning of surgery per anesthesia protocol. potassium chloride (KLOR-CON M10) 10 mEq tablet Take 10 mEq by mouth two (2) times a day. Pt advised not to take the day of surgery, per anesthesia guidelines.         Allergies   Allergen Reactions    Aspirin Nausea Only    Codeine Nausea Only    Lovastatin Myalgia     Social History     Tobacco Use    Smoking status: Never    Smokeless tobacco: Never   Substance Use Topics    Alcohol use: No    Drug use: No     79-year-old lady who returns today for follow-up after her recent evaluation regarding weakness in her legs she is accompanied by her daughter today. She still having difficulty with ambulation. She is imbalance. MRI of the cervical spine with stenosis particularly at C5-C6          EMG performed by one of our neuromuscular experts demonstrated evidence of sensory polyneuropathy    Examination  Visit Vitals  BP (!) 166/74   Pulse 70   Resp 16   SpO2 96%     Pleasant lady. She is awake alert and oriented. Speech and language intact. Strength is full. Reflexes brisk throughout with bilateral Jose    Impression/Plan  79-year-old lady with imbalance and gait disturbance with cervical stenosis most significant C5-C6  She also has peripheral neuropathy  We discussed the both of these I think are combining for her gait disorder  I would like her to see Dr. [facility] just to review her film and to look at her and to discuss whether there is a surgical option and we discussed that she is 79years old so she and her daughter will make that appointment to discuss  Leave follow-up open depending on that conversation    Joaquín Griffith MD    35 minutes spent today in record review, film review personal face-to-face interaction and discussion as well as documentation    This note was created using voice recognition software. Despite editing, there may be syntax errors.

## 2023-08-14 ENCOUNTER — HOSPITAL ENCOUNTER (OUTPATIENT)
Facility: HOSPITAL | Age: 83
Discharge: HOME | End: 2023-08-17
Attending: NEUROLOGICAL SURGERY
Payer: MEDICARE

## 2023-08-14 DIAGNOSIS — G99.2 STENOSIS OF CERVICAL SPINE WITH MYELOPATHY (HCC): ICD-10-CM

## 2023-08-14 DIAGNOSIS — M48.02 STENOSIS OF CERVICAL SPINE WITH MYELOPATHY (HCC): ICD-10-CM

## 2023-08-14 PROCEDURE — 72141 MRI NECK SPINE W/O DYE: CPT

## 2023-09-28 ENCOUNTER — OFFICE VISIT (OUTPATIENT)
Age: 83
End: 2023-09-28
Payer: MEDICARE

## 2023-09-28 VITALS
HEART RATE: 68 BPM | HEIGHT: 64 IN | SYSTOLIC BLOOD PRESSURE: 166 MMHG | WEIGHT: 170 LBS | BODY MASS INDEX: 29.02 KG/M2 | OXYGEN SATURATION: 97 % | RESPIRATION RATE: 18 BRPM | DIASTOLIC BLOOD PRESSURE: 70 MMHG

## 2023-09-28 DIAGNOSIS — M54.50 LUMBAR BACK PAIN: ICD-10-CM

## 2023-09-28 DIAGNOSIS — R26.89 BALANCE PROBLEM: Primary | ICD-10-CM

## 2023-09-28 PROCEDURE — G8427 DOCREV CUR MEDS BY ELIG CLIN: HCPCS

## 2023-09-28 PROCEDURE — 1123F ACP DISCUSS/DSCN MKR DOCD: CPT

## 2023-09-28 PROCEDURE — 1090F PRES/ABSN URINE INCON ASSESS: CPT

## 2023-09-28 PROCEDURE — G8419 CALC BMI OUT NRM PARAM NOF/U: HCPCS

## 2023-09-28 PROCEDURE — G8400 PT W/DXA NO RESULTS DOC: HCPCS

## 2023-09-28 PROCEDURE — 99214 OFFICE O/P EST MOD 30 MIN: CPT

## 2023-09-28 PROCEDURE — 1036F TOBACCO NON-USER: CPT

## 2023-09-28 PROCEDURE — 3078F DIAST BP <80 MM HG: CPT

## 2023-09-28 PROCEDURE — 3077F SYST BP >= 140 MM HG: CPT

## 2023-09-28 ASSESSMENT — PATIENT HEALTH QUESTIONNAIRE - PHQ9
2. FEELING DOWN, DEPRESSED OR HOPELESS: 1
SUM OF ALL RESPONSES TO PHQ QUESTIONS 1-9: 1
1. LITTLE INTEREST OR PLEASURE IN DOING THINGS: 0
SUM OF ALL RESPONSES TO PHQ QUESTIONS 1-9: 1
SUM OF ALL RESPONSES TO PHQ9 QUESTIONS 1 & 2: 1
SUM OF ALL RESPONSES TO PHQ QUESTIONS 1-9: 1
SUM OF ALL RESPONSES TO PHQ QUESTIONS 1-9: 1

## 2023-09-28 ASSESSMENT — ENCOUNTER SYMPTOMS: BACK PAIN: 1

## 2023-09-28 NOTE — PROGRESS NOTES
Jennifer Phelps is a 79 y.o. female who presents with the following  Chief Complaint   Patient presents with    Follow-up     Patient is here for neuropathy. Patient reports that the neuropathy is a little worse, that he balance is worse and she is having lower back pain. HPI  Patient is here today with her daughter for neuropathy and cervical stenosis follow-up. Patient was last seen February 16, 2023 by Dr. Faith Byrnes at which time was a follow-up for leg weakness and difficulty ambulating and imbalance with falls. Dr. Faith Byrnes ordered an MRI of the C-spine in August 2023 that showed moderate to severe stenosis and he sent the patient to Dr. Isidro Snyder for a surgical opinion. She went to see Dr. Yazan Dooley and he repeated her MRI because the first one had artifact. Dr. Yazan Dooley said that the repeat MRI of the cervical spine showed multilevel degenerative changes with varying degrees of canal and bilateral foraminal narrowing but no high-grade cord compression, canal stenosis or cord signal changes. He did not feel that things were bad enough to do surgery. Today the patient states that she is actually having lower back pain. She says that sometimes she has a tingling sensation in her lower extremities and feet but not every day. She says that she has never had any imaging of her lumbar spine but does not really want to have it done. No one has ever ordered physical therapy for her in the past.  She has not had any falls since her last visit here. Patient is not interested in taking a medication every day for neuropathy as she does not feel it is bad enough.     Allergies   Allergen Reactions    Aspirin Nausea Only    Codeine Nausea Only    Lovastatin Myalgia       Current Outpatient Medications   Medication Sig Dispense Refill    amLODIPine (NORVASC) 10 MG tablet Take by mouth daily      bisoprolol-hydroCHLOROthiazide (ZIAC) 2.5-6.25 MG per tablet Take 1 tablet by mouth daily      doxazosin (CARDURA) 1 MG

## 2023-11-10 ENCOUNTER — TELEPHONE (OUTPATIENT)
Age: 83
End: 2023-11-10

## 2023-11-10 DIAGNOSIS — R26.89 BALANCE PROBLEM: Primary | ICD-10-CM

## 2023-11-14 NOTE — TELEPHONE ENCOUNTER
Is calling to see what the status is on the referral that was sent. Advised that it was sent back on 11/10. She states that someone might have taken it off the fax because she does not have it. Is requesting for fax to be resent. Confirmed fax number.

## 2024-04-10 ENCOUNTER — OFFICE VISIT (OUTPATIENT)
Age: 84
End: 2024-04-10
Payer: MEDICARE

## 2024-04-10 VITALS
BODY MASS INDEX: 29.7 KG/M2 | DIASTOLIC BLOOD PRESSURE: 59 MMHG | SYSTOLIC BLOOD PRESSURE: 152 MMHG | RESPIRATION RATE: 14 BRPM | HEART RATE: 60 BPM | OXYGEN SATURATION: 97 % | WEIGHT: 173 LBS

## 2024-04-10 DIAGNOSIS — R41.3 MEMORY DIFFICULTIES: ICD-10-CM

## 2024-04-10 DIAGNOSIS — R41.3 MEMORY DIFFICULTIES: Primary | ICD-10-CM

## 2024-04-10 LAB
T4 FREE SERPL-MCNC: 0.9 NG/DL (ref 0.8–1.5)
TSH SERPL DL<=0.05 MIU/L-ACNC: 1.72 UIU/ML (ref 0.36–3.74)
VIT B12 SERPL-MCNC: 198 PG/ML (ref 193–986)

## 2024-04-10 PROCEDURE — G8400 PT W/DXA NO RESULTS DOC: HCPCS | Performed by: PSYCHIATRY & NEUROLOGY

## 2024-04-10 PROCEDURE — 1090F PRES/ABSN URINE INCON ASSESS: CPT | Performed by: PSYCHIATRY & NEUROLOGY

## 2024-04-10 PROCEDURE — 3078F DIAST BP <80 MM HG: CPT | Performed by: PSYCHIATRY & NEUROLOGY

## 2024-04-10 PROCEDURE — 1123F ACP DISCUSS/DSCN MKR DOCD: CPT | Performed by: PSYCHIATRY & NEUROLOGY

## 2024-04-10 PROCEDURE — 96132 NRPSYC TST EVAL PHYS/QHP 1ST: CPT | Performed by: PSYCHIATRY & NEUROLOGY

## 2024-04-10 PROCEDURE — 3077F SYST BP >= 140 MM HG: CPT | Performed by: PSYCHIATRY & NEUROLOGY

## 2024-04-10 PROCEDURE — 96138 PSYCL/NRPSYC TECH 1ST: CPT | Performed by: PSYCHIATRY & NEUROLOGY

## 2024-04-10 PROCEDURE — G8428 CUR MEDS NOT DOCUMENT: HCPCS | Performed by: PSYCHIATRY & NEUROLOGY

## 2024-04-10 PROCEDURE — G8419 CALC BMI OUT NRM PARAM NOF/U: HCPCS | Performed by: PSYCHIATRY & NEUROLOGY

## 2024-04-10 PROCEDURE — 1036F TOBACCO NON-USER: CPT | Performed by: PSYCHIATRY & NEUROLOGY

## 2024-04-10 PROCEDURE — 99214 OFFICE O/P EST MOD 30 MIN: CPT | Performed by: PSYCHIATRY & NEUROLOGY

## 2024-04-10 RX ORDER — APIXABAN 2.5 MG/1
2.5 TABLET, FILM COATED ORAL 2 TIMES DAILY
COMMUNITY
Start: 2024-03-20

## 2024-04-10 NOTE — PROGRESS NOTES
[facility] and [facility] at [facility] Neurology Clinics at [address] [place] [address] [address] [facility] [address] [place] [address] [phone]              Chief Complaint   Patient presents with    Memory Loss     Going on for a long time, will forget things occurring in the past, where she places items, etc     Current Outpatient Medications   Medication Sig Dispense Refill    ELIQUIS 2.5 MG TABS tablet Take 1 tablet by mouth 2 times daily      amLODIPine (NORVASC) 10 MG tablet Take by mouth daily      bisoprolol-hydroCHLOROthiazide (ZIAC) 2.5-6.25 MG per tablet Take 1 tablet by mouth daily      doxazosin (CARDURA) 1 MG tablet Take 1 tablet by mouth daily      ergocalciferol (ERGOCALCIFEROL) 1.25 MG (30398 UT) capsule TAKE 1 CAPSULE BY MOUTH WEEKLY      hydroCHLOROthiazide (MICROZIDE) 12.5 MG capsule Take 1 capsule by mouth daily      lovastatin (MEVACOR) 10 MG tablet TAKE 1 TABLET BY MOUTH EVERY DAY WITH EVENING MEAL      omeprazole (PRILOSEC OTC) 20 MG tablet Take 1 tablet by mouth daily      potassium chloride (KLOR-CON M) 10 MEQ extended release tablet Take 1 tablet by mouth 2 times daily      pramipexole (MIRAPEX) 0.125 MG tablet TAKE 1 TABLET BY MOUTH 2 HOURS BEFORE BED FOR RESTLESS LEGS & LEG CRAMPS      sertraline (ZOLOFT) 25 MG tablet Take by mouth daily      spironolactone (ALDACTONE) 25 MG tablet Take 0.5 tablets by mouth daily       No current facility-administered medications for this visit.      Allergies   Allergen Reactions    Aspirin Nausea Only    Codeine Nausea Only    Lovastatin Myalgia     Social History     Tobacco Use    Smoking status: Never    Smokeless tobacco: Never   Substance Use Topics    Alcohol use: No    Drug use: No     83-year-old lady following up today.  She was last seen by nurse practitioner Demarcus September 2023.  She was sent for physical therapy and was referred to pain

## 2024-04-10 NOTE — PATIENT INSTRUCTIONS
Neurocognitive consult/testing procedure:    Please contact office after scheduling with one of the facilities listed below with whom you are seeing, date and time of appt and we will fax orders and notes tho that facility and schedule your follow up with our office after testing.      [facility]  Dr. Faith Sidhu  [address]  [facility] [address]  (P) [phone]    [facility]  Dr. Debby Willis  [address] [facility] [address]  [facility] [address]  (P) [phone]     [facility]  Dr. Reed Castillo  [address]  [facility] [address]  (P) [phone]    [email or web address]  Dr. eJniffer Anand  [address] [facility] [address]  [facility] [address]  (P) [phone]  (F) [phone]    [facility] ([email or web address]  Dr. Marine Johnson  [address] [facility] [address] [facility] [place] [address]  Office: [phone]   Fax: [phone]    [facility]   Dr. Eloise Talley  [address] [place], [place] [address]  (P) [phone]  (F) [phone]

## 2024-04-10 NOTE — PROGRESS NOTES
[address] (16 minute minimum)  _19_ minutes were spent administering cognitive testing by medical assistant/nurse.   Cognitive Testing Evaluation     Introduction:     Skylar Hansen  1940  Female   This 83 year old Female was administered a battery of neurocognitive testing on 04/10/2024.     Tests Administered:     Trails A, Trails B, Digit Symbol Substitution, Stroop, Immediate Recognition, Delayed Recognition   The combined test administration time was 19 minutes   Test Results:   Cognitive testing was provided via a battery of cognitive assessments. The pattern of test scores indicate that results are valid.  A Clinical Report with further description of scores and results is also available.   Overall: Patient tested in the 1st* percentile (standard score of 37*).   Trails A: Patient tested in the 30th percentile (scaled standard score of 92).  Trails B: Patient tested in the --* percentile (scaled standard score of null).  Digit Symbol Substitution: Patient tested in the 1st percentile (scaled standard score of 6).  Stroop: Patient tested in the --* percentile (scaled standard score of null).  Immediate Recognition: Patient tested in the 1st percentile (scaled standard score of 17).  Delayed Recognition: Patient tested in the 44th percentile (scaled standard score of 98).   * These assessments were not scored because they were potentially invalid, or the patient failed to complete in the allotted time.   Interpretation of Test Scores:     Examination of individual component tests shows:   Attention - Trails A: unlikely impairment  Mental Flexibility - Trails B: possible impairment  Executive Function - Digit Symbol Substitution: likely impairment  Executive Function - Stroop: possible impairment  Memory - Immediate Recognition: likely impairment  Memory - Delayed Recognition: unlikely impairment    The patient’s overall cognitive test performance was in the 1st percentile when compared to individuals of a

## 2024-04-15 ENCOUNTER — TELEPHONE (OUTPATIENT)
Age: 84
End: 2024-04-15

## 2024-04-15 NOTE — TELEPHONE ENCOUNTER
Patient's daughter, Felecia, called to schedule appt for Pt. Advised scheduling is backed up 1-2 weeks and that someone will be in contact to set up the appts as soon as they can. Felecia thanked me and verbalized understanding. Please call [phone]

## 2024-04-16 ENCOUNTER — TELEPHONE (OUTPATIENT)
Age: 84
End: 2024-04-16

## 2024-04-26 ENCOUNTER — TELEPHONE (OUTPATIENT)
Age: 84
End: 2024-04-26

## 2024-04-26 NOTE — TELEPHONE ENCOUNTER
----- Message from Mikala Fields MD sent at 4/12/2024  8:09 AM EDT -----  B12 on low side  Please have std repletion    ----- Message -----  From: Stephon Hillman Incoming [facility] [employer]  Sent: 4/10/2024   3:12 PM EDT  To: Mikala Fields MD       Problem: SLP Goal  Goal: SLP Goal  Short Term Goals:  1. Pt will participate in ongoing swallow assessment to determine least restrictive diet.      Outcome: Ongoing (interventions implemented as appropriate)  Pt may have free water sparingly at this time, she is refusing pureed textures and significant delay noted overall. DC plan is hospice.

## 2024-04-26 NOTE — TELEPHONE ENCOUNTER
Attempted to reach pt at home number and on mobile.  Unsuccessful.  Will send recs/result to Dr. Valadez to start B12

## 2024-05-20 ENCOUNTER — PROCEDURE VISIT (OUTPATIENT)
Age: 84
End: 2024-05-20
Payer: MEDICARE

## 2024-05-20 DIAGNOSIS — R55 SYNCOPE AND COLLAPSE: Primary | ICD-10-CM

## 2024-05-20 PROCEDURE — 95816 EEG AWAKE AND DROWSY: CPT | Performed by: PSYCHIATRY & NEUROLOGY

## 2024-05-22 NOTE — PROCEDURES
Procedures      [facility]   Electroencephalogram Report    Procedure ID: [id number] Procedure Date: 5/20/2024   Patient Name: Skylar Hansen YOB: 1940   Procedure Type: Routine Medical Record No: [id number]       An EEG is requested in this 84-year-old lady to evaluate for epileptiform abnormalities.  Her medications are said to include Mirapex, Zoloft, Eliquis, Norvasc, hydrochlorothiazide, Cardura, Mevacor    This tracing is obtained during the awake and drowsy states.    During wakefulness, there are intermittent runs of posteriorly dominant and symmetrical low to medium amplitude 9 cps activities which attenuate with eye opening.  Lower voltage faster frequency activities are seen symmetrically over the anterior head regions.    Hyperventilation is not performed.    Intermittent photic stimulation little alters the tracing.    During drowsiness, the background rhythms attenuate and are replaced with diffuse symmetric theta range activities.  Later stages of sleep are not attained.    Interpretation  This EEG recorded during the awake and drowsy states is normal.        Mikala Fields MD

## 2024-07-30 ENCOUNTER — TELEPHONE (OUTPATIENT)
Age: 84
End: 2024-07-30

## 2024-07-30 NOTE — TELEPHONE ENCOUNTER
LVM with following info    EEG done 5/20/24  \"Interpretation  This EEG recorded during the awake and drowsy states is normal.\"

## 2024-12-02 ENCOUNTER — TELEPHONE (OUTPATIENT)
Age: 84
End: 2024-12-02

## 2024-12-02 NOTE — TELEPHONE ENCOUNTER
Called to offer an earlier appointment.  Patient's daughter stated she isn't able to take an earlier appointment.

## 2024-12-16 ENCOUNTER — TELEPHONE (OUTPATIENT)
Age: 84
End: 2024-12-16

## 2024-12-18 ENCOUNTER — HOSPITAL ENCOUNTER (OUTPATIENT)
Facility: HOSPITAL | Age: 84
Discharge: HOME | End: 2024-12-21
Attending: RADIOLOGY
Payer: MEDICARE

## 2024-12-18 DIAGNOSIS — N28.89 RENAL MASS: ICD-10-CM

## 2024-12-18 PROCEDURE — 76770 US EXAM ABDO BACK WALL COMP: CPT
